# Patient Record
Sex: FEMALE | Race: WHITE
[De-identification: names, ages, dates, MRNs, and addresses within clinical notes are randomized per-mention and may not be internally consistent; named-entity substitution may affect disease eponyms.]

---

## 2018-03-26 ENCOUNTER — HOSPITAL ENCOUNTER (OUTPATIENT)
Dept: HOSPITAL 75 - RAD | Age: 68
End: 2018-03-26
Attending: UROLOGY
Payer: MEDICARE

## 2018-03-26 DIAGNOSIS — N20.0: Primary | ICD-10-CM

## 2018-03-26 PROCEDURE — 74018 RADEX ABDOMEN 1 VIEW: CPT

## 2018-03-26 NOTE — DIAGNOSTIC IMAGING REPORT
INDICATION: Left kidney stone.



TIME OF EXAMINATION: 12:53 p.m.



FINDINGS: Single view of the abdomen does show a calcific density

overlying the mid portion of the left kidney approximately 6 mm

in size. Right kidney is obscured by bowel gas. No definite

ureteral calculi are detected. Pelvic calcifications likely

represent phleboliths. The bowel gas pattern is nonobstructive.

Gallbladder is surgically absent.



IMPRESSION: Left renal calculus. No other significant abnormality

is detected.



Dictated by: 



  Dictated on workstation # [id number]

## 2018-04-12 ENCOUNTER — HOSPITAL ENCOUNTER (OUTPATIENT)
Dept: HOSPITAL 75 - RAD | Age: 68
End: 2018-04-12
Attending: UROLOGY
Payer: MEDICARE

## 2018-04-12 DIAGNOSIS — K57.30: ICD-10-CM

## 2018-04-12 DIAGNOSIS — N20.0: Primary | ICD-10-CM

## 2018-04-12 PROCEDURE — 74176 CT ABD & PELVIS W/O CONTRAST: CPT

## 2018-04-12 NOTE — DIAGNOSTIC IMAGING REPORT
PROCEDURE: CT abdomen and pelvis without contrast.



TECHNIQUE: Multiple contiguous axial images were obtained through

the abdomen and pelvis without the use of intravenous contrast. 



INDICATION: Left renal stones with nausea and back pain.



COMPARISON: No prior studies are available for comparison.



FINDINGS: Lung bases demonstrate scarring or atelectasis in the

lingula. No discrete liver mass is identified. The gallbladder is

surgically absent. The pancreas and spleen are unremarkable. No

adrenal mass is identified. The right kidney is unremarkable.

There appears to be a grouping of nonobstructing calculi within

the left mid kidney, in aggregate measuring approximately 5 mm.

No hydronephrosis is seen. No ureteral calculi are detected.

Aorta is calcified but nonaneurysmal. Small and large bowel loops

are normal in caliber. There is sigmoid diverticulosis but no

evidence of acute diverticulitis. The bladder is decompressed. No

free fluid is seen.



IMPRESSION:

1. Nonobstructing left renal calculi.

2. Uncomplicated diverticulosis.



Dictated by: 



  Dictated on workstation # [id number]

## 2018-10-18 ENCOUNTER — HOSPITAL ENCOUNTER (OUTPATIENT)
Dept: HOSPITAL 75 - RAD | Age: 68
End: 2018-10-18
Attending: UROLOGY
Payer: MEDICARE

## 2018-10-18 DIAGNOSIS — N20.0: Primary | ICD-10-CM

## 2018-10-18 PROCEDURE — 74018 RADEX ABDOMEN 1 VIEW: CPT

## 2018-10-18 NOTE — DIAGNOSTIC IMAGING REPORT
INDICATION: Left-sided renal stone.



COMPARISON: 03/26/2018.



FINDINGS: Single supine radiographic view of the abdomen was

obtained and again demonstrates 6 mm calculus projecting over the

left renal shadow consistent with nephrolithiasis when compared

to previous CT. Multiple pelvic phleboliths are also noted. No

unexpected radiopaque foreign bodies are seen. Small bowel loops

are nondistended. Bony structures show mild levoscoliotic

deformity of the lumbar spine with multilevel degenerative

changes.



IMPRESSION:

1. Stable left-sided nephrolithiasis.



Dictated by: 



  Dictated on workstation # [id number]

## 2018-11-14 ENCOUNTER — HOSPITAL ENCOUNTER (OUTPATIENT)
Dept: HOSPITAL 75 - PREOP | Age: 68
Discharge: HOME | End: 2018-11-14
Attending: UROLOGY
Payer: MEDICARE

## 2018-11-14 VITALS — HEIGHT: 65 IN | BODY MASS INDEX: 26.82 KG/M2 | WEIGHT: 161 LBS

## 2018-11-14 DIAGNOSIS — Z01.818: Primary | ICD-10-CM

## 2018-11-20 ENCOUNTER — HOSPITAL ENCOUNTER (OUTPATIENT)
Dept: HOSPITAL 75 - SDC | Age: 68
Discharge: HOME | End: 2018-11-20
Attending: UROLOGY
Payer: MEDICARE

## 2018-11-20 VITALS — BODY MASS INDEX: 26.82 KG/M2 | WEIGHT: 161 LBS | HEIGHT: 65 IN

## 2018-11-20 VITALS — SYSTOLIC BLOOD PRESSURE: 164 MMHG | DIASTOLIC BLOOD PRESSURE: 92 MMHG

## 2018-11-20 VITALS — DIASTOLIC BLOOD PRESSURE: 83 MMHG | SYSTOLIC BLOOD PRESSURE: 140 MMHG

## 2018-11-20 VITALS — SYSTOLIC BLOOD PRESSURE: 129 MMHG | DIASTOLIC BLOOD PRESSURE: 84 MMHG

## 2018-11-20 VITALS — SYSTOLIC BLOOD PRESSURE: 143 MMHG | DIASTOLIC BLOOD PRESSURE: 85 MMHG

## 2018-11-20 DIAGNOSIS — Z87.891: ICD-10-CM

## 2018-11-20 DIAGNOSIS — N20.0: Primary | ICD-10-CM

## 2018-11-20 PROCEDURE — 87081 CULTURE SCREEN ONLY: CPT

## 2018-11-20 PROCEDURE — 74018 RADEX ABDOMEN 1 VIEW: CPT

## 2018-11-20 RX ADMIN — SODIUM CHLORIDE, SODIUM LACTATE, POTASSIUM CHLORIDE, AND CALCIUM CHLORIDE PRN MLS/HR: 600; 310; 30; 20 INJECTION, SOLUTION INTRAVENOUS at 07:26

## 2018-11-20 RX ADMIN — SODIUM CHLORIDE, SODIUM LACTATE, POTASSIUM CHLORIDE, AND CALCIUM CHLORIDE PRN MLS/HR: 600; 310; 30; 20 INJECTION, SOLUTION INTRAVENOUS at 09:15

## 2018-11-20 NOTE — PROGRESS NOTE-POST OPERATIVE
Post-Operative Progess Note


Surgeon (s)/Assistant (s)


Surgeon


ELIAS TAWIL MD


Assistant:  NONE





Pre-Operative Diagnosis


LT RENAL STONE





Post-Operative Diagnosis





SAME





Procedure & Operative Findings


Date of Procedure


11/20/18


Procedure Performed/Findings


LT ESWL


Anesthesia Type


GENERAL





Estimated Blood Loss


Estimated blood loss (mL):  NONE





Specimens/Packing


Specimens Removed


NONE


Packing:  


NONE











TAWIL,ELIAS A MD Nov 20, 2018 8:47 am

## 2018-11-20 NOTE — DIAGNOSTIC IMAGING REPORT
EXAMINATION: Supine abdomen at 11:34 a.m.



INDICATION: Post ESWL.



FINDINGS: The abdomen exam performed earlier today at 07:29 a.m.

noted a few calcific densities overlying the left kidney.

Reportedly, in the interval since the prior study, the patient

has undergone ESWL. The calcific densities in question are not as

well visualized on this study. The left kidney is obscured

however by bowel gas and fecal material.



There are no other pathological calcifications seen. There are

again numerous phleboliths in the pelvis.



IMPRESSION:

1. The calcifications overlying the left kidney seen previously

are not well visualized on this exam as the left kidney is now

obscured by bowel gas and fecal material.

2. If indicated, a follow-up KUB will be recommended.



Dictated by: 



  Dictated on workstation # [id number]

## 2018-11-20 NOTE — OPERATIVE REPORT
DATE OF SERVICE:  11/20/2018



PREOPERATIVE DIAGNOSIS:

Left renal stones.



POSTOPERATIVE DIAGNOSIS:

Left renal stones.



OPERATION PERFORMED:

Left ESWL.



SURGEON:

Elias Tawil, MD



ANESTHESIA:

General.



COMPLICATIONS:

None.



DESCRIPTION OF PROCEDURE:

Under satisfactory general anesthesia, the patient in supine position on the

ESWL table, the left renal stones were localized.  Shocks were delivered at kV

of 4.  A total of 2500 shocks completely fragmented the stones that were not

visualized.  I did not give the patient any Lasix or Toradol because she is very

sensitive to medications.  She tolerated the procedure and anesthesia well and

was sent to recovery room in stable condition.





Job ID: [id number]

DocumentID: [id number]

Dictated Date:  11/20/2018 09:11:19

Transcription Date: 11/20/2018 14:30:20

Dictated By: ELIAS TAWIL, MD

## 2018-11-20 NOTE — DIAGNOSTIC IMAGING REPORT
INDICATION: Preop evaluation for ESWL.



COMPARISON: 10/18/2018.



FINDINGS: Single supine radiographic view of the abdomen was

obtained and demonstrates extraosseous calcifications projecting

over the left kidney consistent with nephrolithiasis. No other

unexpected extraosseous calcifications or radiopaque foreign

bodies are seen. Multiple pelvic phleboliths are noted. Included

small bowel loops are nondistended. There is mild levoscoliotic

deformity of the lumbar spine.



IMPRESSION:

1. Left-sided nephrolithiasis.



Dictated by: 



  Dictated on workstation # [id number]

## 2018-11-20 NOTE — PROGRESS NOTE-PRE OPERATIVE
Pre-Operative Progress Note


H&P Reviewed


The H&P was reviewed, patient examined and no changes noted.


Date Seen by Provider:  Nov 20, 2018


Time Seen by Provider:  07:05


Date H&P Reviewed:  Nov 20, 2018


Time H&P Reviewed:  07:05


Pre-Operative Diagnosis:  LT RENAL STONE











TAWIL,ELIAS A MD Nov 20, 2018 7:05 am

## 2018-11-20 NOTE — DISCHARGE INST-UROLOGY
Discharge Inst-Urology


Patient Instructions/Follow Up


Plan


Please make appointment to been seen in office in 2 weeks. KUB prior to it





KUB on way home





Post ESWL instructions





Tylenol or IBP PRN





Increase oral fluids for 48 hours and then as needed.





Diet and Activity as tolerated.





If questions or concerns contact your physician 


Or seek help at emergency department.











TAWIL,ELIAS A MD Nov 20, 2018 8:49 am

## 2018-11-20 NOTE — ANESTHESIA-GENERAL POST-OP
General


Patient Condition


Mental Status/LOC:  Same as Preop


Cardiovascular:  Satisfactory


Nausea/Vomiting:  Absent


Respiratory:  Satisfactory


Pain:  Controlled


Complications:  Absent





Post Op Complications


Complications


None





Follow Up Care/Instructions


Patient Instructions


None needed.





Anesthesia/Patient Condition


Patient Condition


Patient is doing well, no complaints, stable vital signs, no apparent adverse 

anesthesia problems.   


No complications reported per nursing.











GRAY MORAN CRNA Nov 20, 2018 10:26

## 2018-12-04 ENCOUNTER — HOSPITAL ENCOUNTER (OUTPATIENT)
Dept: HOSPITAL 75 - RAD | Age: 68
End: 2018-12-04
Attending: UROLOGY
Payer: MEDICARE

## 2018-12-04 DIAGNOSIS — N20.0: Primary | ICD-10-CM

## 2018-12-04 PROCEDURE — 74018 RADEX ABDOMEN 1 VIEW: CPT

## 2018-12-04 NOTE — DIAGNOSTIC IMAGING REPORT
EXAMINATION: Abdominal radiographs, single supine view.



DATE: December 4, 2018. 



CLINICAL INDICATION: 68-year-old female, followup renal stone.

History of shock wave lithotripsy.



COMPARISON: November 20, 2018.



COMMENTS: There are multiple pelvic calcifications likely

relating to phleboliths. There is a subtle radiodensity

projecting over the left renal shadow which potentially could

relate to a small nonobstructing renal stone versus fecal

material. This measures roughly 2 mm in size. There is no

otherwise identified radiographically apparent potential renal

stone or stone at the level of the ureters. There are right upper

quadrant surgical clips. There are no abnormally distended

gas-filled segments of bowel.



IMPRESSION:  



1. A 2 mm punctate area of high attenuation projecting over the

left renal shadow which potentially could relate to a

nonobstructing left renal stone versus fecal material.

2. No otherwise radiographically visible potential renal or

ureteral stone.

3. Pelvic calcifications likely relate to phleboliths.





  Dictated on workstation # [id number]